# Patient Record
Sex: FEMALE | Race: WHITE | NOT HISPANIC OR LATINO | Employment: OTHER | URBAN - METROPOLITAN AREA
[De-identification: names, ages, dates, MRNs, and addresses within clinical notes are randomized per-mention and may not be internally consistent; named-entity substitution may affect disease eponyms.]

---

## 2017-05-04 ENCOUNTER — TELEPHONE (OUTPATIENT)
Dept: MEDICAL GROUP | Facility: MEDICAL CENTER | Age: 82
End: 2017-05-04

## 2017-05-04 NOTE — TELEPHONE ENCOUNTER
Future Appointments       Provider Department Center    5/10/2017 2:00 PM Salima Salvador M.D. [facility] [address]        Left message for patient to call back regarding N2U pre-visit planning. Please transfer call to [phone].

## 2018-05-14 ENCOUNTER — PATIENT OUTREACH (OUTPATIENT)
Dept: HEALTH INFORMATION MANAGEMENT | Facility: OTHER | Age: 83
End: 2018-05-14

## 2018-05-14 NOTE — PROGRESS NOTES
Outcome: Not available/ answer machine is full    Please transfer to Patient Outreach Team at [phone] when patient returns call.    [facility] Verified: yes    Attempt # 1

## 2018-06-11 NOTE — PROGRESS NOTES
Outcome: Called pt to schedule an AWV/ a lady answered and hung up/ called again, no answered.    Attempt # 2

## 2018-10-08 ENCOUNTER — PATIENT OUTREACH (OUTPATIENT)
Dept: HEALTH INFORMATION MANAGEMENT | Facility: OTHER | Age: 83
End: 2018-10-08

## 2018-10-08 NOTE — PROGRESS NOTES
Outcome:  VM full    Please transfer to Patient Outreach Team at [phone] when patient returns call.    [employer] Checked & Epic Updated:  yes    [facility] Verified: yes    Attempt # 1

## 2018-10-30 NOTE — PROGRESS NOTES
Outcome: VM full    Please transfer to Patient Outreach Team at [phone] when patient returns call.    Attempt # 2

## 2018-11-09 NOTE — PROGRESS NOTES
Outcome: caller hung up    Please transfer to Patient Outreach Team at [phone] when patient returns call.        Attempt # 3

## 2018-11-13 NOTE — PROGRESS NOTES
Outcome: caller hung up    Please transfer to Patient Outreach Team at [phone] when patient returns call.        Attempt # 4

## 2019-01-08 ENCOUNTER — OFFICE VISIT (OUTPATIENT)
Dept: URGENT CARE | Facility: CLINIC | Age: 84
End: 2019-01-08
Payer: MEDICARE

## 2019-01-08 ENCOUNTER — HOSPITAL ENCOUNTER (OUTPATIENT)
Facility: MEDICAL CENTER | Age: 84
End: 2019-01-08
Attending: NURSE PRACTITIONER
Payer: MEDICARE

## 2019-01-08 ENCOUNTER — APPOINTMENT (OUTPATIENT)
Dept: RADIOLOGY | Facility: IMAGING CENTER | Age: 84
End: 2019-01-08
Attending: NURSE PRACTITIONER
Payer: MEDICARE

## 2019-01-08 VITALS
RESPIRATION RATE: 20 BRPM | OXYGEN SATURATION: 100 % | WEIGHT: 96 LBS | SYSTOLIC BLOOD PRESSURE: 140 MMHG | HEART RATE: 100 BPM | DIASTOLIC BLOOD PRESSURE: 80 MMHG | TEMPERATURE: 99.2 F

## 2019-01-08 DIAGNOSIS — R05.9 COUGH: ICD-10-CM

## 2019-01-08 DIAGNOSIS — N39.0 URINARY TRACT INFECTION WITHOUT HEMATURIA, SITE UNSPECIFIED: ICD-10-CM

## 2019-01-08 DIAGNOSIS — J22 LOWER RESP. TRACT INFECTION: ICD-10-CM

## 2019-01-08 LAB
APPEARANCE UR: ABNORMAL
BILIRUB UR STRIP-MCNC: NEGATIVE MG/DL
COLOR UR AUTO: YELLOW
GLUCOSE UR STRIP.AUTO-MCNC: NEGATIVE MG/DL
KETONES UR STRIP.AUTO-MCNC: NEGATIVE MG/DL
LEUKOCYTE ESTERASE UR QL STRIP.AUTO: ABNORMAL
NITRITE UR QL STRIP.AUTO: NEGATIVE
PH UR STRIP.AUTO: 5 [PH] (ref 5–8)
PROT UR QL STRIP: NEGATIVE MG/DL
RBC UR QL AUTO: NEGATIVE
SP GR UR STRIP.AUTO: 1.02
UROBILINOGEN UR STRIP-MCNC: NEGATIVE MG/DL

## 2019-01-08 PROCEDURE — 71046 X-RAY EXAM CHEST 2 VIEWS: CPT | Mod: 26 | Performed by: NURSE PRACTITIONER

## 2019-01-08 PROCEDURE — 87086 URINE CULTURE/COLONY COUNT: CPT

## 2019-01-08 PROCEDURE — 81002 URINALYSIS NONAUTO W/O SCOPE: CPT | Performed by: NURSE PRACTITIONER

## 2019-01-08 PROCEDURE — 99203 OFFICE O/P NEW LOW 30 MIN: CPT | Performed by: NURSE PRACTITIONER

## 2019-01-08 PROCEDURE — 99000 SPECIMEN HANDLING OFFICE-LAB: CPT | Performed by: NURSE PRACTITIONER

## 2019-01-08 RX ORDER — CEFDINIR 300 MG/1
300 CAPSULE ORAL EVERY 12 HOURS
Qty: 14 CAP | Refills: 0 | Status: SHIPPED | OUTPATIENT
Start: 2019-01-08 | End: 2019-01-15

## 2019-01-08 RX ORDER — MIRTAZAPINE 15 MG/1
TABLET, FILM COATED ORAL
COMMUNITY
Start: 2018-12-04

## 2019-01-08 RX ORDER — CLONIDINE HYDROCHLORIDE 0.3 MG/1
TABLET ORAL
COMMUNITY
Start: 2018-10-31

## 2019-01-08 RX ORDER — ENALAPRIL MALEATE AND HYDROCHLOROTHIAZIDE 10; 25 MG/1; MG/1
1 TABLET ORAL DAILY
COMMUNITY

## 2019-01-08 ASSESSMENT — ENCOUNTER SYMPTOMS
MUSCULOSKELETAL NEGATIVE: 1
SORE THROAT: 0
FEVER: 0
PALPITATIONS: 0
VOMITING: 0
DIARRHEA: 0
STRIDOR: 0
ABDOMINAL PAIN: 0
CHILLS: 0
HEADACHES: 0
BLURRED VISION: 0
BLOOD IN STOOL: 0
COUGH: 1
WHEEZING: 0
CONSTIPATION: 0
DIZZINESS: 0
DOUBLE VISION: 0
FLANK PAIN: 0
NAUSEA: 0

## 2019-01-08 NOTE — PROGRESS NOTES
Subjective:   Karen Man is a 87 y.o. female who presents for Cough (C/o body aches, cough, headache ache, difficulty eating x1 wk)        HPI   Patient with new onset body aches, cough, and loss of appetite x1 week. Body aches and cough started a week ago. Cough is dry and non productive. Symptoms are persistent and unchanged.  Loss of appetite has started over the past 2 days. Son states that patient has been just wanting to drink liquids. Denies alleviating or aggravating factors. Has been taking OTC cold medications intermittently.  Patient states that her urine has had a foul odor lately.  Denies fever or chills. Denies nausea, vomiting, or diarrhea.    Patient is accompanied by son, who lives next door.    Review of Systems   Constitutional: Positive for malaise/fatigue. Negative for chills and fever.   HENT: Negative for congestion, ear discharge, ear pain and sore throat.    Eyes: Negative for blurred vision and double vision.   Respiratory: Positive for cough. Negative for wheezing and stridor.    Cardiovascular: Negative for chest pain and palpitations.   Gastrointestinal: Negative for abdominal pain, blood in stool, constipation, diarrhea, melena, nausea and vomiting.   Genitourinary: Negative for flank pain and hematuria.        Urine with foul odor   Musculoskeletal: Negative.    Skin: Negative.  Negative for itching and rash.   Neurological: Negative for dizziness and headaches.   All other systems reviewed and are negative.      PMH:  has no past medical history of Allergy.  MEDS:   Current Outpatient Prescriptions:   •  cloNIDine (CATAPRESS) 0.3 MG Tab, , Disp: , Rfl:   •  mirtazapine (REMERON) 15 MG Tab, , Disp: , Rfl:   •  enalapril-hydrochlorthiazide (VASERETIC) 10-25 MG per tablet, Take 1 Tab by mouth every day., Disp: , Rfl:   •  cefdinir (OMNICEF) 300 MG Cap, Take 1 Cap by mouth every 12 hours for 7 days., Disp: 14 Cap, Rfl: 0  ALLERGIES: No Known Allergies  SURGHX: History reviewed.  No pertinent surgical history.  SOCHX:  reports that she has never smoked. She has never used smokeless tobacco.  FH: Family history was reviewed, no pertinent findings to report     Objective:   /80   Pulse 100   Temp 37.3 °C (99.2 °F) (Temporal)   Resp 20   Wt 43.5 kg (96 lb)   SpO2 100%   Physical Exam   Constitutional: She is oriented to person, place, and time. Vital signs are normal. She appears well-developed and well-nourished. No distress.   HENT:   Head: Normocephalic.   Right Ear: Hearing and ear canal normal. Tympanic membrane is not erythematous. A middle ear effusion is present.   Left Ear: Hearing, tympanic membrane and ear canal normal. Tympanic membrane is not erythematous.  No middle ear effusion.   Nose: Rhinorrhea present. Right sinus exhibits no maxillary sinus tenderness and no frontal sinus tenderness. Left sinus exhibits no maxillary sinus tenderness and no frontal sinus tenderness.   Mouth/Throat: Oropharynx is clear and moist and mucous membranes are normal. No posterior oropharyngeal erythema. Tonsils are 0 on the right. Tonsils are 0 on the left. No tonsillar exudate.   Eyes: Pupils are equal, round, and reactive to light. Conjunctivae, EOM and lids are normal.   Neck: Normal range of motion. No thyromegaly present.   Cardiovascular: Normal rate, regular rhythm and normal heart sounds.    Pulmonary/Chest: Effort normal. No accessory muscle usage. No apnea, no tachypnea and no bradypnea. No respiratory distress. She has decreased breath sounds in the right lower field. She has no wheezes.   Abdominal: Soft. Normal appearance and bowel sounds are normal. There is no hepatosplenomegaly. There is no tenderness. There is no CVA tenderness.   No signs of acute abdomen   Lymphadenopathy:        Head (right side): No submandibular and no tonsillar adenopathy present.        Head (left side): No submandibular and no tonsillar adenopathy present.   Neurological: She is alert and oriented  to person, place, and time.   Skin: Skin is warm and dry. No rash noted. She is not diaphoretic.   Psychiatric: She has a normal mood and affect. Her behavior is normal. Judgment and thought content normal. Her speech is delayed.   Vitals reviewed.        Assessment/Plan:   Assessment    1. Cough  - DX-CHEST-2 VIEWS; Future    2. Urinary tract infection without hematuria, site unspecified  - POCT Urinalysis  - URINE CULTURE(NEW); Future  - cefdinir (OMNICEF) 300 MG Cap; Take 1 Cap by mouth every 12 hours for 7 days.  Dispense: 14 Cap; Refill: 0    3. Lower resp. tract infection  - cefdinir (OMNICEF) 300 MG Cap; Take 1 Cap by mouth every 12 hours for 7 days.  Dispense: 14 Cap; Refill: 0    Other orders  - cloNIDine (CATAPRESS) 0.3 MG Tab;   - mirtazapine (REMERON) 15 MG Tab;   - enalapril-hydrochlorthiazide (VASERETIC) 10-25 MG per tablet; Take 1 Tab by mouth every day.    UA with leuks; will send for culture and call if need to change antibiotic. Call son with urine culture results, if needed.    Xray:  1.  No evidence of acute cardiopulmonary process.    2.  Borderline cardiomegaly.    3.  Hyperinflated lungs with biapical scarring.    4.  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    Patient encouraged to increase clear liquid intake    Discussed signs/symptoms for immediate follow up and to return to office if no improvement with symptoms.     Discussed signs and symptoms of when to go to ER    Differential diagnosis, natural history, supportive care, and indications for immediate follow-up discussed.

## 2019-01-11 ENCOUNTER — TELEPHONE (OUTPATIENT)
Dept: URGENT CARE | Facility: CLINIC | Age: 84
End: 2019-01-11

## 2019-01-11 LAB
BACTERIA UR CULT: NORMAL
SIGNIFICANT IND 70042: NORMAL
SITE SITE: NORMAL
SOURCE SOURCE: NORMAL

## 2019-01-11 NOTE — TELEPHONE ENCOUNTER
Called and spoke to patient's son (caregiver) regarding urine culture results. He states the patient is much improved and is eating and drinking better. He has been giving her OTC Coricidin HBP for cold symptoms. He is to continue with course of antibiotic.    Return to office is symptoms worsen or fail to improve.

## 2021-01-14 DIAGNOSIS — Z23 NEED FOR VACCINATION: ICD-10-CM
